# Patient Record
Sex: FEMALE | Race: WHITE | Employment: PART TIME | URBAN - METROPOLITAN AREA
[De-identification: names, ages, dates, MRNs, and addresses within clinical notes are randomized per-mention and may not be internally consistent; named-entity substitution may affect disease eponyms.]

---

## 2018-07-23 ENCOUNTER — HOSPITAL ENCOUNTER (OUTPATIENT)
Dept: PSYCHIATRY | Age: 63
Setting detail: THERAPIES SERIES
Discharge: HOME | End: 2018-07-23
Payer: COMMERCIAL

## 2018-07-23 NOTE — BH NOTE
Pt came in for an intake. Pt said she completed an alcohol detoxic program and was here was IOP. Therapist explained that it was a dual diagnoses program.  Pt said she did not have any mental health issues and did not want to do the intake. Therapist gave Pt a list of substance abuse programs in [facility].

## 2018-12-30 ENCOUNTER — TELEPHONE (OUTPATIENT)
Dept: OTHER | Age: 63
End: 2018-12-30

## 2020-02-27 ENCOUNTER — HOSPITAL ENCOUNTER (INPATIENT)
Age: 65
LOS: 2 days | Discharge: HOME | DRG: 897 | End: 2020-03-01
Attending: EMERGENCY MEDICINE | Admitting: INTERNAL MEDICINE
Payer: COMMERCIAL

## 2020-02-27 ENCOUNTER — APPOINTMENT (OUTPATIENT)
Dept: CT IMAGING | Age: 65
DRG: 897 | End: 2020-02-27
Payer: COMMERCIAL

## 2020-02-27 LAB
A/G RATIO: 0.7 (ref 1.1–2.2)
ALBUMIN SERPL-MCNC: 3.4 G/DL (ref 3.4–5)
ALP BLD-CCNC: 191 U/L (ref 40–129)
ALT SERPL-CCNC: 48 U/L (ref 10–40)
ANION GAP SERPL CALCULATED.3IONS-SCNC: 21 MMOL/L (ref 3–16)
AST SERPL-CCNC: 132 U/L (ref 15–37)
BASOPHILS ABSOLUTE: 0.1 K/UL (ref 0–0.2)
BASOPHILS RELATIVE PERCENT: 1 %
BILIRUB SERPL-MCNC: 1.6 MG/DL (ref 0–1)
BUN BLDV-MCNC: 4 MG/DL (ref 7–20)
CALCIUM SERPL-MCNC: 8.9 MG/DL (ref 8.3–10.6)
CHLORIDE BLD-SCNC: 92 MMOL/L (ref 99–110)
CO2: 23 MMOL/L (ref 21–32)
CREAT SERPL-MCNC: 0.6 MG/DL (ref 0.6–1.2)
EOSINOPHILS ABSOLUTE: 0 K/UL (ref 0–0.6)
EOSINOPHILS RELATIVE PERCENT: 0.2 %
GFR AFRICAN AMERICAN: >60
GFR NON-AFRICAN AMERICAN: >60
GLOBULIN: 4.8 G/DL
GLUCOSE BLD-MCNC: 160 MG/DL (ref 70–99)
HCT VFR BLD CALC: 41 % (ref 36–48)
HEMOGLOBIN: 14 G/DL (ref 12–16)
LIPASE: 36 U/L (ref 13–60)
LYMPHOCYTES ABSOLUTE: 0.7 K/UL (ref 1–5.1)
LYMPHOCYTES RELATIVE PERCENT: 8.5 %
MAGNESIUM: 1.7 MG/DL (ref 1.8–2.4)
MCH RBC QN AUTO: 32.7 PG (ref 26–34)
MCHC RBC AUTO-ENTMCNC: 34.2 G/DL (ref 31–36)
MCV RBC AUTO: 95.8 FL (ref 80–100)
MONOCYTES ABSOLUTE: 0.6 K/UL (ref 0–1.3)
MONOCYTES RELATIVE PERCENT: 8 %
NEUTROPHILS ABSOLUTE: 6.4 K/UL (ref 1.7–7.7)
NEUTROPHILS RELATIVE PERCENT: 82.3 %
PDW BLD-RTO: 16.5 % (ref 12.4–15.4)
PLATELET # BLD: 189 K/UL (ref 135–450)
PMV BLD AUTO: 9.1 FL (ref 5–10.5)
POTASSIUM REFLEX MAGNESIUM: 3.3 MMOL/L (ref 3.5–5.1)
RBC # BLD: 4.28 M/UL (ref 4–5.2)
SODIUM BLD-SCNC: 136 MMOL/L (ref 136–145)
TOTAL PROTEIN: 8.2 G/DL (ref 6.4–8.2)
WBC # BLD: 7.8 K/UL (ref 4–11)

## 2020-02-27 PROCEDURE — 83690 ASSAY OF LIPASE: CPT

## 2020-02-27 PROCEDURE — 96375 TX/PRO/DX INJ NEW DRUG ADDON: CPT

## 2020-02-27 PROCEDURE — 85025 COMPLETE CBC W/AUTO DIFF WBC: CPT

## 2020-02-27 PROCEDURE — 93005 ELECTROCARDIOGRAM TRACING: CPT | Performed by: NURSE PRACTITIONER

## 2020-02-27 PROCEDURE — 83735 ASSAY OF MAGNESIUM: CPT

## 2020-02-27 PROCEDURE — 70450 CT HEAD/BRAIN W/O DYE: CPT

## 2020-02-27 PROCEDURE — 6360000002 HC RX W HCPCS: Performed by: NURSE PRACTITIONER

## 2020-02-27 PROCEDURE — 99291 CRITICAL CARE FIRST HOUR: CPT

## 2020-02-27 PROCEDURE — 80053 COMPREHEN METABOLIC PANEL: CPT

## 2020-02-27 PROCEDURE — G0480 DRUG TEST DEF 1-7 CLASSES: HCPCS

## 2020-02-27 PROCEDURE — 2580000003 HC RX 258: Performed by: NURSE PRACTITIONER

## 2020-02-27 RX ORDER — SODIUM CHLORIDE 0.9 % (FLUSH) 0.9 %
10 SYRINGE (ML) INJECTION EVERY 12 HOURS SCHEDULED
Status: DISCONTINUED | OUTPATIENT
Start: 2020-02-27 | End: 2020-03-01 | Stop reason: HOSPADM

## 2020-02-27 RX ORDER — LORAZEPAM 2 MG/ML
4 INJECTION INTRAMUSCULAR
Status: DISCONTINUED | OUTPATIENT
Start: 2020-02-27 | End: 2020-03-01 | Stop reason: HOSPADM

## 2020-02-27 RX ORDER — SODIUM CHLORIDE 0.9 % (FLUSH) 0.9 %
10 SYRINGE (ML) INJECTION PRN
Status: DISCONTINUED | OUTPATIENT
Start: 2020-02-27 | End: 2020-03-01 | Stop reason: HOSPADM

## 2020-02-27 RX ORDER — LORAZEPAM 2 MG/ML
1 INJECTION INTRAMUSCULAR
Status: DISCONTINUED | OUTPATIENT
Start: 2020-02-27 | End: 2020-03-01 | Stop reason: HOSPADM

## 2020-02-27 RX ORDER — LORAZEPAM 1 MG/1
4 TABLET ORAL
Status: DISCONTINUED | OUTPATIENT
Start: 2020-02-27 | End: 2020-03-01 | Stop reason: HOSPADM

## 2020-02-27 RX ORDER — LORAZEPAM 2 MG/ML
2 INJECTION INTRAMUSCULAR
Status: DISCONTINUED | OUTPATIENT
Start: 2020-02-27 | End: 2020-03-01 | Stop reason: HOSPADM

## 2020-02-27 RX ORDER — LORAZEPAM 2 MG/ML
3 INJECTION INTRAMUSCULAR
Status: DISCONTINUED | OUTPATIENT
Start: 2020-02-27 | End: 2020-03-01 | Stop reason: HOSPADM

## 2020-02-27 RX ORDER — LORAZEPAM 1 MG/1
2 TABLET ORAL
Status: DISCONTINUED | OUTPATIENT
Start: 2020-02-27 | End: 2020-03-01 | Stop reason: HOSPADM

## 2020-02-27 RX ORDER — ONDANSETRON 2 MG/ML
4 INJECTION INTRAMUSCULAR; INTRAVENOUS ONCE
Status: COMPLETED | OUTPATIENT
Start: 2020-02-27 | End: 2020-02-27

## 2020-02-27 RX ORDER — POTASSIUM CHLORIDE 750 MG/1
10 TABLET, EXTENDED RELEASE ORAL ONCE
Status: COMPLETED | OUTPATIENT
Start: 2020-02-27 | End: 2020-02-28

## 2020-02-27 RX ORDER — 0.9 % SODIUM CHLORIDE 0.9 %
1000 INTRAVENOUS SOLUTION INTRAVENOUS ONCE
Status: COMPLETED | OUTPATIENT
Start: 2020-02-27 | End: 2020-02-28

## 2020-02-27 RX ORDER — LORAZEPAM 1 MG/1
1 TABLET ORAL
Status: DISCONTINUED | OUTPATIENT
Start: 2020-02-27 | End: 2020-03-01 | Stop reason: HOSPADM

## 2020-02-27 RX ORDER — MAGNESIUM SULFATE 1 G/100ML
1 INJECTION INTRAVENOUS ONCE
Status: COMPLETED | OUTPATIENT
Start: 2020-02-27 | End: 2020-02-28

## 2020-02-27 RX ORDER — LORAZEPAM 1 MG/1
3 TABLET ORAL
Status: DISCONTINUED | OUTPATIENT
Start: 2020-02-27 | End: 2020-03-01 | Stop reason: HOSPADM

## 2020-02-27 RX ADMIN — LORAZEPAM 1 MG: 2 INJECTION, SOLUTION INTRAMUSCULAR; INTRAVENOUS at 23:43

## 2020-02-27 RX ADMIN — SODIUM CHLORIDE 1000 ML: 9 INJECTION, SOLUTION INTRAVENOUS at 22:47

## 2020-02-27 RX ADMIN — ONDANSETRON 4 MG: 2 INJECTION INTRAMUSCULAR; INTRAVENOUS at 22:45

## 2020-02-27 NOTE — LETTER
[id number] [address] [address]  Phone: [phone]             March 1, 2020    Patient: Emma Sutherland   YOB: 1955   Date of Visit: 2/27/2020       To Whom It May Concern:    Emma Sutherland was seen and treated in our facility  beginning 2/27/2020 until 3/1/2020. She may return to work on 3/3/2020.       Sincerely,       Anne Marie Pinzon RN         Signature:__________________________________

## 2020-02-28 PROBLEM — F10.931 ALCOHOL WITHDRAWAL DELIRIUM (HCC): Status: ACTIVE | Noted: 2020-02-28

## 2020-02-28 LAB
ALBUMIN SERPL-MCNC: 2.8 G/DL (ref 3.4–5)
ALP BLD-CCNC: 150 U/L (ref 40–129)
ALT SERPL-CCNC: 36 U/L (ref 10–40)
AMPHETAMINE SCREEN, URINE: NORMAL
ANION GAP SERPL CALCULATED.3IONS-SCNC: 13 MMOL/L (ref 3–16)
AST SERPL-CCNC: 102 U/L (ref 15–37)
BARBITURATE SCREEN URINE: NORMAL
BASOPHILS ABSOLUTE: 0 K/UL (ref 0–0.2)
BASOPHILS RELATIVE PERCENT: 0.6 %
BENZODIAZEPINE SCREEN, URINE: NORMAL
BILIRUB SERPL-MCNC: 1.3 MG/DL (ref 0–1)
BILIRUBIN DIRECT: 0.6 MG/DL (ref 0–0.3)
BILIRUBIN, INDIRECT: 0.7 MG/DL (ref 0–1)
BUN BLDV-MCNC: 4 MG/DL (ref 7–20)
CALCIUM SERPL-MCNC: 7.9 MG/DL (ref 8.3–10.6)
CANNABINOID SCREEN URINE: NORMAL
CHLORIDE BLD-SCNC: 97 MMOL/L (ref 99–110)
CO2: 27 MMOL/L (ref 21–32)
COCAINE METABOLITE SCREEN URINE: NORMAL
CREAT SERPL-MCNC: <0.5 MG/DL (ref 0.6–1.2)
EKG ATRIAL RATE: 110 BPM
EKG DIAGNOSIS: NORMAL
EKG P AXIS: 49 DEGREES
EKG P-R INTERVAL: 148 MS
EKG Q-T INTERVAL: 382 MS
EKG QRS DURATION: 86 MS
EKG QTC CALCULATION (BAZETT): 516 MS
EKG R AXIS: 12 DEGREES
EKG T AXIS: 3 DEGREES
EKG VENTRICULAR RATE: 110 BPM
EOSINOPHILS ABSOLUTE: 0 K/UL (ref 0–0.6)
EOSINOPHILS RELATIVE PERCENT: 0.3 %
ETHANOL: NORMAL MG/DL (ref 0–0.08)
GFR AFRICAN AMERICAN: >60
GFR NON-AFRICAN AMERICAN: >60
GLUCOSE BLD-MCNC: 113 MG/DL (ref 70–99)
HCT VFR BLD CALC: 35.7 % (ref 36–48)
HEMOGLOBIN: 12.2 G/DL (ref 12–16)
LYMPHOCYTES ABSOLUTE: 1 K/UL (ref 1–5.1)
LYMPHOCYTES RELATIVE PERCENT: 12.7 %
Lab: NORMAL
MAGNESIUM: 1.9 MG/DL (ref 1.8–2.4)
MCH RBC QN AUTO: 32.9 PG (ref 26–34)
MCHC RBC AUTO-ENTMCNC: 34.2 G/DL (ref 31–36)
MCV RBC AUTO: 96.4 FL (ref 80–100)
METHADONE SCREEN, URINE: NORMAL
MONOCYTES ABSOLUTE: 1 K/UL (ref 0–1.3)
MONOCYTES RELATIVE PERCENT: 12.1 %
NEUTROPHILS ABSOLUTE: 5.9 K/UL (ref 1.7–7.7)
NEUTROPHILS RELATIVE PERCENT: 74.3 %
OPIATE SCREEN URINE: NORMAL
OXYCODONE URINE: NORMAL
PDW BLD-RTO: 16.2 % (ref 12.4–15.4)
PH UA: 5
PHENCYCLIDINE SCREEN URINE: NORMAL
PLATELET # BLD: 142 K/UL (ref 135–450)
PMV BLD AUTO: 9.1 FL (ref 5–10.5)
POTASSIUM REFLEX MAGNESIUM: 3.1 MMOL/L (ref 3.5–5.1)
PROPOXYPHENE SCREEN: NORMAL
RBC # BLD: 3.7 M/UL (ref 4–5.2)
SODIUM BLD-SCNC: 137 MMOL/L (ref 136–145)
TOTAL PROTEIN: 6.6 G/DL (ref 6.4–8.2)
WBC # BLD: 8 K/UL (ref 4–11)

## 2020-02-28 PROCEDURE — 87186 SC STD MICRODIL/AGAR DIL: CPT

## 2020-02-28 PROCEDURE — 2580000003 HC RX 258: Performed by: NURSE PRACTITIONER

## 2020-02-28 PROCEDURE — 80048 BASIC METABOLIC PNL TOTAL CA: CPT

## 2020-02-28 PROCEDURE — 80307 DRUG TEST PRSMV CHEM ANLYZR: CPT

## 2020-02-28 PROCEDURE — 87077 CULTURE AEROBIC IDENTIFY: CPT

## 2020-02-28 PROCEDURE — 87086 URINE CULTURE/COLONY COUNT: CPT

## 2020-02-28 PROCEDURE — 2580000003 HC RX 258: Performed by: EMERGENCY MEDICINE

## 2020-02-28 PROCEDURE — 6370000000 HC RX 637 (ALT 250 FOR IP): Performed by: INTERNAL MEDICINE

## 2020-02-28 PROCEDURE — 96365 THER/PROPH/DIAG IV INF INIT: CPT

## 2020-02-28 PROCEDURE — 6360000002 HC RX W HCPCS: Performed by: EMERGENCY MEDICINE

## 2020-02-28 PROCEDURE — 6360000002 HC RX W HCPCS: Performed by: NURSE PRACTITIONER

## 2020-02-28 PROCEDURE — 99223 1ST HOSP IP/OBS HIGH 75: CPT | Performed by: PSYCHIATRY & NEUROLOGY

## 2020-02-28 PROCEDURE — 6370000000 HC RX 637 (ALT 250 FOR IP): Performed by: NURSE PRACTITIONER

## 2020-02-28 PROCEDURE — 80076 HEPATIC FUNCTION PANEL: CPT

## 2020-02-28 PROCEDURE — 2500000003 HC RX 250 WO HCPCS: Performed by: EMERGENCY MEDICINE

## 2020-02-28 PROCEDURE — 85025 COMPLETE CBC W/AUTO DIFF WBC: CPT

## 2020-02-28 PROCEDURE — 2060000000 HC ICU INTERMEDIATE R&B

## 2020-02-28 PROCEDURE — 83735 ASSAY OF MAGNESIUM: CPT

## 2020-02-28 PROCEDURE — 36415 COLL VENOUS BLD VENIPUNCTURE: CPT

## 2020-02-28 RX ORDER — CHLORDIAZEPOXIDE HYDROCHLORIDE 25 MG/1
50 CAPSULE, GELATIN COATED ORAL 3 TIMES DAILY
Status: DISCONTINUED | OUTPATIENT
Start: 2020-02-28 | End: 2020-02-29

## 2020-02-28 RX ORDER — SODIUM CHLORIDE 0.9 % (FLUSH) 0.9 %
10 SYRINGE (ML) INJECTION PRN
Status: DISCONTINUED | OUTPATIENT
Start: 2020-02-28 | End: 2020-03-01 | Stop reason: HOSPADM

## 2020-02-28 RX ORDER — PROMETHAZINE HYDROCHLORIDE 25 MG/1
12.5 TABLET ORAL EVERY 6 HOURS PRN
Status: DISCONTINUED | OUTPATIENT
Start: 2020-02-28 | End: 2020-03-01 | Stop reason: HOSPADM

## 2020-02-28 RX ORDER — POTASSIUM CHLORIDE 20 MEQ/1
40 TABLET, EXTENDED RELEASE ORAL PRN
Status: DISCONTINUED | OUTPATIENT
Start: 2020-02-28 | End: 2020-03-01 | Stop reason: HOSPADM

## 2020-02-28 RX ORDER — POTASSIUM CHLORIDE 7.45 MG/ML
10 INJECTION INTRAVENOUS PRN
Status: DISCONTINUED | OUTPATIENT
Start: 2020-02-28 | End: 2020-03-01 | Stop reason: HOSPADM

## 2020-02-28 RX ORDER — AMLODIPINE BESYLATE 5 MG/1
5 TABLET ORAL DAILY
Status: DISCONTINUED | OUTPATIENT
Start: 2020-02-28 | End: 2020-03-01 | Stop reason: HOSPADM

## 2020-02-28 RX ORDER — SODIUM CHLORIDE 0.9 % (FLUSH) 0.9 %
10 SYRINGE (ML) INJECTION EVERY 12 HOURS SCHEDULED
Status: DISCONTINUED | OUTPATIENT
Start: 2020-02-28 | End: 2020-03-01 | Stop reason: HOSPADM

## 2020-02-28 RX ORDER — ACETAMINOPHEN 650 MG/1
650 SUPPOSITORY RECTAL EVERY 6 HOURS PRN
Status: DISCONTINUED | OUTPATIENT
Start: 2020-02-28 | End: 2020-03-01 | Stop reason: HOSPADM

## 2020-02-28 RX ORDER — ONDANSETRON 2 MG/ML
4 INJECTION INTRAMUSCULAR; INTRAVENOUS EVERY 6 HOURS PRN
Status: DISCONTINUED | OUTPATIENT
Start: 2020-02-28 | End: 2020-03-01 | Stop reason: HOSPADM

## 2020-02-28 RX ORDER — ASPIRIN 81 MG/1
81 TABLET, CHEWABLE ORAL EVERY OTHER DAY
Status: DISCONTINUED | OUTPATIENT
Start: 2020-02-28 | End: 2020-03-01 | Stop reason: HOSPADM

## 2020-02-28 RX ORDER — SODIUM CHLORIDE 9 MG/ML
INJECTION, SOLUTION INTRAVENOUS CONTINUOUS
Status: DISCONTINUED | OUTPATIENT
Start: 2020-02-28 | End: 2020-03-01 | Stop reason: HOSPADM

## 2020-02-28 RX ORDER — POTASSIUM CHLORIDE 20 MEQ/1
40 TABLET, EXTENDED RELEASE ORAL ONCE
Status: DISCONTINUED | OUTPATIENT
Start: 2020-02-28 | End: 2020-03-01 | Stop reason: HOSPADM

## 2020-02-28 RX ORDER — LISINOPRIL 20 MG/1
20 TABLET ORAL DAILY
Status: DISCONTINUED | OUTPATIENT
Start: 2020-02-28 | End: 2020-03-01 | Stop reason: HOSPADM

## 2020-02-28 RX ORDER — ATORVASTATIN CALCIUM 10 MG/1
10 TABLET, FILM COATED ORAL DAILY
Status: DISCONTINUED | OUTPATIENT
Start: 2020-02-28 | End: 2020-02-28

## 2020-02-28 RX ORDER — ACETAMINOPHEN 325 MG/1
650 TABLET ORAL EVERY 6 HOURS PRN
Status: DISCONTINUED | OUTPATIENT
Start: 2020-02-28 | End: 2020-03-01 | Stop reason: HOSPADM

## 2020-02-28 RX ADMIN — LISINOPRIL 20 MG: 20 TABLET ORAL at 09:14

## 2020-02-28 RX ADMIN — ENOXAPARIN SODIUM 40 MG: 40 INJECTION SUBCUTANEOUS at 09:14

## 2020-02-28 RX ADMIN — MAGNESIUM SULFATE HEPTAHYDRATE 1 G: 1 INJECTION, SOLUTION INTRAVENOUS at 00:28

## 2020-02-28 RX ADMIN — SERTRALINE HYDROCHLORIDE 100 MG: 50 TABLET ORAL at 09:14

## 2020-02-28 RX ADMIN — LORAZEPAM 2 MG: 2 INJECTION, SOLUTION INTRAMUSCULAR; INTRAVENOUS at 09:17

## 2020-02-28 RX ADMIN — ATORVASTATIN CALCIUM 10 MG: 10 TABLET, FILM COATED ORAL at 09:14

## 2020-02-28 RX ADMIN — SODIUM CHLORIDE: 9 INJECTION, SOLUTION INTRAVENOUS at 16:08

## 2020-02-28 RX ADMIN — POTASSIUM CHLORIDE 40 MEQ: 20 TABLET, EXTENDED RELEASE ORAL at 20:49

## 2020-02-28 RX ADMIN — Medication 10 ML: at 09:15

## 2020-02-28 RX ADMIN — SODIUM CHLORIDE: 9 INJECTION, SOLUTION INTRAVENOUS at 05:20

## 2020-02-28 RX ADMIN — POTASSIUM CHLORIDE 10 MEQ: 10 TABLET, EXTENDED RELEASE ORAL at 00:25

## 2020-02-28 RX ADMIN — FOLIC ACID: 5 INJECTION, SOLUTION INTRAMUSCULAR; INTRAVENOUS; SUBCUTANEOUS at 05:20

## 2020-02-28 RX ADMIN — Medication 10 ML: at 09:14

## 2020-02-28 RX ADMIN — CHLORDIAZEPOXIDE HYDROCHLORIDE 50 MG: 25 CAPSULE ORAL at 20:49

## 2020-02-28 RX ADMIN — CHLORDIAZEPOXIDE HYDROCHLORIDE 50 MG: 25 CAPSULE ORAL at 09:14

## 2020-02-28 RX ADMIN — AMLODIPINE BESYLATE 5 MG: 5 TABLET ORAL at 09:14

## 2020-02-28 RX ADMIN — PHENYTOIN SODIUM 1155 MG: 50 INJECTION INTRAMUSCULAR; INTRAVENOUS at 04:26

## 2020-02-28 RX ADMIN — ASPIRIN 81 MG 81 MG: 81 TABLET ORAL at 09:14

## 2020-02-28 RX ADMIN — SODIUM CHLORIDE: 9 INJECTION, SOLUTION INTRAVENOUS at 23:49

## 2020-02-28 ASSESSMENT — PAIN SCALES - GENERAL: PAINLEVEL_OUTOF10: 0

## 2020-02-28 NOTE — PROGRESS NOTES
Notified Dr. Cher Helm answering service, Dani Moritz, @ [phone] 2/28/20 re: neurology consult. -[phone] [facility]

## 2020-02-28 NOTE — CONSULTS
In patient Neurology consult        [facility] Neurology      Arian Rizvi MD      Amelia Reasons  1955    Date of Service: 2/28/2020    Referring Physician: Parviz Marino MD    Most of the history was obtained from detailed chart reviewing and discussion with the patient. The patient is currently confused and unable to provide me with accurate history. Reason for the consult and CC: Acute delirium and possible new onset seizure. HPI:   The patient is a 62y.o.  years old female with history of hypertension, hyperlipidemia, depression and alcohol abuse who was admitted to the hospital yesterday with possible new onset witnessed seizure. According to report, symptoms started last night at work. She has not observed a generalized motor seizure for at least few minutes. Degree was severe. No triggers. Other associated symptoms included postictal state and confusion. No other living aggravating factors. She is alcoholic according to report and she has history of alcohol withdrawal seizure in the past.  According to report, her last drink was 2 days ago. The patient is currently lethargic but waxing and waning and was able to answer simple questions. She denies any headache, chest pain, dysphagia or dysarthria. Initial work-up in the ED revealed unremarkable CBC and CMP except for potassium of 3.1 and calcium 7.9. UDS was negative. BAL is negative. Initial CT of the head showed no acute findings. Other review of system was unremarkable. Family history: Noncontributory. No family history of epilepsy. Past Medical History:   Diagnosis Date    Anxiety     Hyperlipidemia     Hypertension      No past surgical history on file.   Social History     Tobacco Use    Smoking status: Former Smoker     Packs/day: 1.50     Years: 20.00     Pack years: 30.00     Types: Cigarettes    Smokeless tobacco: Never Used   Substance Use Topics    Alcohol use: Yes     Comment: 1 bottle of wine daily  Drug use: No     Allergies   Allergen Reactions    Codeine Nausea Only    Pcn [Penicillins]      Current Facility-Administered Medications   Medication Dose Route Frequency Provider Last Rate Last Dose    amLODIPine (NORVASC) tablet 5 mg  5 mg Oral Daily Shonda King, APRN - NP   5 mg at 02/28/20 9716    aspirin chewable tablet 81 mg  81 mg Oral Every Other Day Shonda Hendrixs, APRN - NP   81 mg at 02/28/20 0914    lisinopril (PRINIVIL;ZESTRIL) tablet 20 mg  20 mg Oral Daily Shonda Hendrixs, APRN - NP   20 mg at 02/28/20 0914    sertraline (ZOLOFT) tablet 100 mg  100 mg Oral Daily Shonda Hendrixs, APRN - NP   100 mg at 02/28/20 0914    0.9 % sodium chloride infusion   Intravenous Continuous Shonda Fernando, APRN -  mL/hr at 02/28/20 0520      sodium chloride flush 0.9 % injection 10 mL  10 mL Intravenous 2 times per day Shonda Fernando, APRN - NP   10 mL at 02/28/20 0914    sodium chloride flush 0.9 % injection 10 mL  10 mL Intravenous PRN Shonda King, APRN - NP        sodium chloride 0.9 % 6,714 mL with folic acid 1 mg, adult multi-vitamin with vitamin k 10 mL, thiamine 100 mg   Intravenous Daily Shonda Fernando, APRN - NP   Stopped at 02/28/20 0915    acetaminophen (TYLENOL) tablet 650 mg  650 mg Oral Q6H PRN Shonda Hendrixs, APRN - NP        acetaminophen (TYLENOL) suppository 650 mg  650 mg Rectal Q6H PRN [place] Fernando, APRN - NP        promethazine (PHENERGAN) tablet 12.5 mg  12.5 mg Oral Q6H PRN [place] Hendrixs, APRN - NP        ondansetron (ZOFRAN) injection 4 mg  4 mg Intravenous Q6H PRN Shonda Fernando, APRN - NP        enoxaparin (LOVENOX) injection 40 mg  40 mg Subcutaneous Daily Shonda Hendrixs, APRN - NP   40 mg at 02/28/20 0914    chlordiazePOXIDE (LIBRIUM) capsule 50 mg  50 mg Oral TID Shonda Fernando, APRN - NP   50 mg at 02/28/20 0914    potassium chloride (KLOR-CON M) extended release tablet 40 mEq  40 mEq Oral PRN Clinton Mccauley MD        Or    potassium bicarb-citric acid (EFFER-K) effervescent tablet 40 mEq  40 mEq Oral PRN Christina Medina MD        Or    potassium chloride 10 mEq/100 mL IVPB (Peripheral Line)  10 mEq Intravenous PRN Darleen Ferreira MD        potassium chloride (KLOR-CON M) extended release tablet 40 mEq  40 mEq Oral Once Darleen Ferreira MD        sodium chloride flush 0.9 % injection 10 mL  10 mL Intravenous 2 times per day FRANCIS Blevins - CNP   10 mL at 02/28/20 0915    sodium chloride flush 0.9 % injection 10 mL  10 mL Intravenous PRN Sara Mcbride APRN - CNP        LORazepam (ATIVAN) tablet 1 mg  1 mg Oral Q1H PRN Olita Ovens, APRN - NP        Or    LORazepam (ATIVAN) injection 1 mg  1 mg Intravenous Q1H PRN Olita Ovens, APRN - NP   1 mg at 02/27/20 2343    Or    LORazepam (ATIVAN) tablet 2 mg  2 mg Oral Q1H PRN Olita Ovens, APRN - NP        Or    LORazepam (ATIVAN) injection 2 mg  2 mg Intravenous Q1H PRN Olita Ovens, APRN - NP   2 mg at 02/28/20 1329    Or    LORazepam (ATIVAN) tablet 3 mg  3 mg Oral Q1H PRN Olita Ovens, APRN - NP        Or    LORazepam (ATIVAN) injection 3 mg  3 mg Intravenous Q1H PRN Olita Ovens, APRN - NP        Or    LORazepam (ATIVAN) tablet 4 mg  4 mg Oral Q1H PRN Olita Ovens, APRN - NP        Or    LORazepam (ATIVAN) injection 4 mg  4 mg Intravenous Q1H PRN Geraldine Knott, APRN - NP           ROS: 10-14 system review was limited due to MS changes or as per HPI. Constitutional:   Vitals:    02/28/20 0522 02/28/20 0912 02/28/20 0914 02/28/20 1207   BP: (!) 144/84 137/79  122/80   Pulse: 105 105 105 99   Resp: 18 18  18   Temp: 98.7 °F (37.1 °C) 99.2 °F (37.3 °C)  99.4 °F (37.4 °C)   TempSrc: Oral Oral  Oral   SpO2: 91% 91%  92%   Weight: 173 lb 4.5 oz (78.6 kg)          General appearance: Confused   Eye: PRRR  Fundus: Funduscopic examination could not be performed due to patient's poor cooperation. Neck: supple  Cardiovascular: No lower leg edema with good pulsation.    Mental Status:   AAO times one  Poor attention and concentration. Waxing and waning. Unable to assess recent or remote memory due to confusion  Language: Fluent but with poor comprehension and not following directions  Unable to assess fund of knowledge due to confusion. Cranial Nerves:   II: Visual fields: NT due to confusion. Pupils: equal, round, reactive to light  III,IV,VI: Extra Ocular Movements are intact. No nystagmus  V: Facial sensation : NT due to confusion  VII: Facial strength and movements: intact and symmetric  VIII: Hearing: NT due to confusion  IX: Palate elevation NT due to confusion  XI: Shoulder shrug: NT due to confusion  XII: Tongue movements: NT due to confusion  Musculoskeletal:  The patient can move all 4 extremities. No apparent focal weakness. Tone: Normal tone. No rigidity. Reflexes: Bilateral biceps 2/4, triceps 2/4, brachial radialis 2/4, knee 2/4 and ankle 2/4. Planters: flexor bilaterally. Coordination: NT due to confusion  Sensation: NT due to confusion  Gait/Posture: NT due to confusion    Data:  LABS:   Lab Results   Component Value Date     02/28/2020    K 3.1 02/28/2020    CL 97 02/28/2020    CO2 27 02/28/2020    BUN 4 02/28/2020    CREATININE <0.5 02/28/2020    GFRAA >60 02/28/2020    LABGLOM >60 02/28/2020    GLUCOSE 113 02/28/2020    PHOS 2.7 06/13/2018    MG 1.90 02/28/2020    CALCIUM 7.9 02/28/2020     Lab Results   Component Value Date    WBC 8.0 02/28/2020    RBC 3.70 02/28/2020    HGB 12.2 02/28/2020    HCT 35.7 02/28/2020    MCV 96.4 02/28/2020    RDW 16.2 02/28/2020     02/28/2020     Lab Results   Component Value Date    INR 1.09 04/09/2016    PROTIME 12.5 04/09/2016       Neuroimaging were independently reviewed by myself. Reviewed notes from different physicians  Reviewed lab and blood testing    Impression:  Acute encephalopathy and possible new onset seizure. Could be consistent with alcohol withdrawal seizure according to report.   No risk for epilepsy  Alcohol

## 2020-02-28 NOTE — FLOWSHEET NOTE
02/28/20 0522   Assessment   Charting Type Admission   Neurological   Neuro (WDL) X   Level of Consciousness 1   Orientation Level Oriented X4   Cognition Poor attention/concentration;Poor safety awareness;Poor judgement; Follows commands   Language Delayed responses;Clear   NIH/MNIHSS Stroke Scale Assessed No   [place] Coma Scale   Eye Opening 3   Best Verbal Response 5   Best Motor Response 6   Amandeep Coma Scale Score 14   HEENT   HEENT (WDL) X   Right Eye Impaired vision   Left Eye Impaired vision   Teeth Missing teeth   Respiratory   Respiratory (WDL) X   Respiratory Pattern Regular   Respiratory Depth Normal   Respiratory Quality/Effort Unlabored   Chest Assessment Trachea midline; Chest expansion symmetrical   L Breath Sounds Diminished   R Breath Sounds Diminished   Cardiac   Cardiac (WDL) WDL   Cardiac Rhythm ST   Rhythm Interpretation   Pulse 105   Cardiac Monitor   Telemetry Monitor On Yes   Telemetry Audible Yes   Telemetry Alarms Set Yes   Gastrointestinal   Abdominal (WDL) X   Abdomen Inspection Distended; Taut   Tenderness Soft; No guarding;Nontender   RUQ Bowel Sounds Active   LUQ Bowel Sounds Active   RLQ Bowel Sounds Active   LLQ Bowel Sounds Active   Peripheral Vascular   Peripheral Vascular (WDL) WDL   Edema None   Skin Color/Condition   Skin Color/Condition (WDL) WDL   Skin Integrity   Skin Integrity (WDL) X   Skin Integrity Redness  (blanchable)   Location sacrum   Preventative Dressing No   Skin Fold Management No   Multiple Skin Integrity Sites No   Musculoskeletal   Musculoskeletal (WDL) WDL   Genitourinary   Genitourinary (WDL) WDL   Flank Tenderness No   Suprapubic Tenderness No   Dysuria No   Anus/Rectum   Anus/Rectum (WDL) WDL   Psychosocial   Psychosocial (WDL) WDL

## 2020-02-28 NOTE — PROGRESS NOTES
Patient admitted to room 444 from ED. Patient oriented to room, call light, bed rails, phone, lights and bathroom. Patient instructed about the schedule of the day including: vital sign frequency, lab draws, possible tests, frequency of MD and staff rounds, including RN/MD rounding together at bedside, daily weights, and I &O's. Patient instructed about prescribed diet, how to use 8MENU, and television. Bed alarm in place, patient aware of placement and reason. Telemetry box  in place, patient aware of placement and reason. Bed locked, in lowest position, side rails up 2/4, call light within reach. Will continue to monitor.

## 2020-02-28 NOTE — PROGRESS NOTES
4 Eyes Skin Assessment     The patient is being assess for   Shift Handoff    I agree that 2 RN's have performed a thorough Head to Toe Skin Assessment on the patient. ALL assessment sites listed below have been assessed. Areas assessed by both nurses:   [x]   Head, Face, and Ears   [x]   Shoulders, Back, and Chest, Abdomen  [x]   Arms, Elbows, and Hands   [x]   Coccyx, Sacrum, and Ischium  [x]   Legs, Feet, and Heels            **SHARE this note so that the co-signing nurse is able to place an eSignature**    Co-signer eSignature:   Electronically signed by Wen Poe RN on 2/28/2020 at 6:50 AM    Does the Patient have Skin Breakdown?   No          Kiran Prevention initiated:  No   Wound Care Orders initiated:  No      [facility] nurse consulted for Pressure Injury (Stage 3,4, Unstageable, DTI, NWPT, Complex wounds)and New or Established Ostomies:  No      Primary Nurse eSignature: Electronically signed by Irving Hays RN on 2/28/20 at 5:55 AM

## 2020-02-28 NOTE — ED NOTES
Report given Floor RN. Pt transported to the floor via stretcher.      Gamal Zuñiga RN  02/28/20 4702

## 2020-02-28 NOTE — PROGRESS NOTES
Hospitalist Progress Note      PCP: Omari Hackett MD    Date of Admission: 2/27/2020    Chief Complaint: Seizure    Hospital Course: H&P reviewed. Patient with history of alcohol abuse with alcohol withdrawal in past with seizures. Had witnessed seizure yesterday last drink was day prior to presentation. Was admitted for further care    Subjective:     No seizures since admission. Patient denies any complaints today. Medications:  Reviewed    Infusion Medications    sodium chloride 125 mL/hr at 02/28/20 0520     Scheduled Medications    amLODIPine  5 mg Oral Daily    aspirin  81 mg Oral Every Other Day    lisinopril  20 mg Oral Daily    sertraline  100 mg Oral Daily    atorvastatin  10 mg Oral Daily    sodium chloride flush  10 mL Intravenous 2 times per day    folic acid, thiamine, multi-vitamin with vitamin K infusion   Intravenous Daily    enoxaparin  40 mg Subcutaneous Daily    chlordiazePOXIDE  50 mg Oral TID    sodium chloride flush  10 mL Intravenous 2 times per day     PRN Meds: sodium chloride flush, acetaminophen, acetaminophen, promethazine, ondansetron, potassium chloride **OR** potassium alternative oral replacement **OR** potassium chloride, sodium chloride flush, LORazepam **OR** LORazepam **OR** LORazepam **OR** LORazepam **OR** LORazepam **OR** LORazepam **OR** LORazepam **OR** LORazepam    No intake or output data in the 24 hours ending 02/28/20 1244    Physical Exam Performed:    /79   Pulse 105   Temp 99.2 °F (37.3 °C) (Oral)   Resp 18   Wt 173 lb 4.5 oz (78.6 kg)   SpO2 91%   BMI 27.97 kg/m²     General appearance: No apparent distress, appears stated age and cooperative. HEENT: Pupils equal, round, and reactive to light. Conjunctivae/corneas clear. Neck: Supple, with full range of motion. No jugular venous distention. Trachea midline. Respiratory:  Normal respiratory effort.  Clear to auscultation, bilaterally without Rales/Wheezes/Rhonchi. Cardiovascular: Regular rate and rhythm with normal S1/S2 without murmurs, rubs or gallops. Abdomen: Soft, non-tender, non-distended with normal bowel sounds. Musculoskeletal: No clubbing, cyanosis or edema bilaterally. Full range of motion without deformity. Skin: Warm and dry  Neurologic:  Neurovascularly intact without any focal sensory/motor deficits. Cranial nerves: II-XII intact, grossly non-focal.  Mild hand tremors  Psychiatric: Alert, not anxious appearing  Capillary Refill: Brisk,< 3 seconds   Peripheral Pulses: +2 palpable, equal bilaterally       Labs:   Recent Labs     02/27/20 2251 02/28/20  0555   WBC 7.8 8.0   HGB 14.0 12.2   HCT 41.0 35.7*    142     Recent Labs     02/27/20 2251 02/28/20  0555    137   K 3.3* 3.1*   CL 92* 97*   CO2 23 27   BUN 4* 4*   CREATININE 0.6 <0.5*   CALCIUM 8.9 7.9*     Recent Labs     02/27/20 2251 02/28/20  0555   * 102*   ALT 48* 36   BILIDIR  --  0.6*   BILITOT 1.6* 1.3*   ALKPHOS 191* 150*     No results for input(s): INR in the last 72 hours. No results for input(s): Eugune Ran in the last 72 hours. Urinalysis:      Lab Results   Component Value Date    NITRU Negative 06/13/2018    WBCUA 3-5 04/07/2016    BACTERIA 3+ 04/07/2016    RBCUA 3-5 04/07/2016    BLOODU Negative 06/13/2018    SPECGRAV 1.010 06/13/2018    GLUCOSEU Negative 06/13/2018       Radiology:  CT Head WO Contrast   Final Result   No hemorrhage or mass identified      Periventricular and scattered frontal parietal white matter disease, likely   due to small-vessel ischemic change, with more focal remote infarct on the   right.   Ischemic changes have progressed since 2016                 Assessment/Plan:    Active Hospital Problems    Diagnosis    [facility] [F10.239]    HTN (hypertension) [I10]    HLD (hyperlipidemia) [E78.5]    DTs (delirium tremens) ([facility] [F10.231]    Depression [F32.9]    Alcohol abuse [F10.10] Seizure: has known hx of seizures with alcohol withdrawal in the past. Last drink reported on 2/26. CT head non acute. Given a dose of IV dilantin in ER. Continue seizure precautions. Neuro consulted. Alcohol abuse with withdrawal seizure: Supportive care with CIWA  Protocol, scheduled Librium. Transaminitis : likely due to chronic alcohol use. LFT improving on repeat lab. Hold statin      Hyperlipidemia: hold statin due to trasaminitis     HTN : continue home meds. Hypokalemia: replace    Hypomagnesemia: replaced with improvement .  Resolved     Hypocalcemia: mild, Corrected calcium for albumin normal.     DVT Prophylaxis: Lovenox   diet: DIET GENERAL;  Code Status: Full Code    PT/OT Eval Status: Not indicated at this time      Dispo - 1 to 2 days pending clinical course    Racheal Marsh MD

## 2020-02-28 NOTE — H&P
Hospital Medicine History & Physical      PCP: Baltazar Scott MD    Date of Admission: 2/27/2020    Date of Service: Pt seen/examined on 2/28/2020 and Admitted to Inpatient with expected LOS greater than two midnights due to medical therapy. Chief Complaint:    Chief Complaint   Patient presents with    Seizures     at work not feeling well, EMS called and while they were there patient had 20-30 second seizure - no reported hx     History Of Present Illness:      62 y.o. female, with PMH of HTN, HLD, alcohol abuse, and anxiety/depression, who presented to [facility] with seizure. History was obtained from the patient and review of the EMR. The patient states that while at work last night, she was not feeling well when she suddenly was witnessed having a 20-30 second seizure. The patient states that she has had an alcohol withdrawal seizure in the past, these are the only times when she has had a seizure is when she has detoxed from alcohol. She reports to me only drinking 2 glasses of wine on a daily basis. Last known drink was on 02/26/2020 around 2100. Per ED report, the patient stated that she drinks a bottle of wine on a daily basis. Denies any injury, pain, fever/chills, or any other symptoms. The patient denied any other associated symptoms as well as any aggravating and/or alleviating factors. At the time of this assessment, the patient was resting comfortably in bed. She currently denies any chest pain, back pain, abdominal pain, shortness of breath, numbness, tingling, N/V/C/D, fever and/or chills. Past Medical History:          Diagnosis Date    Anxiety     Hyperlipidemia     Hypertension        Past Surgical History:      No past surgical history on file. Medications Prior to Admission:      Prior to Admission medications    Medication Sig Start Date End Date Taking?  Authorizing Provider   Multiple Vitamins-Minerals (THERAPEUTIC MULTIVITAMIN-MINERALS) tablet Take 1 tablet gallops. Abdomen: Soft, non-tender, non-distended with normal bowel sounds. Musculoskeletal:  No clubbing, cyanosis or edema bilaterally. Full range of motion without deformity. Skin: Skin color, texture, turgor normal.  No rashes or lesions. Neurologic:  Neurovascularly intact without any focal sensory/motor deficits. Cranial nerves: II-XII intact, grossly non-focal.  Psychiatric:  Alert and oriented, thought content appropriate, normal insight  Capillary Refill: Brisk,< 3 seconds   Peripheral Pulses: +2 palpable, equal bilaterally       Labs:     Recent Labs     02/27/20  2251   WBC 7.8   HGB 14.0   HCT 41.0        Recent Labs     02/27/20  2251      K 3.3*   CL 92*   CO2 23   BUN 4*   CREATININE 0.6   CALCIUM 8.9     Recent Labs     02/27/20  2251   *   ALT 48*   BILITOT 1.6*   ALKPHOS 191*     No results for input(s): INR in the last 72 hours. No results for input(s): Estela Gong in the last 72 hours. Urinalysis:      Lab Results   Component Value Date    NITRU Negative 06/13/2018    WBCUA 3-5 04/07/2016    BACTERIA 3+ 04/07/2016    RBCUA 3-5 04/07/2016    BLOODU Negative 06/13/2018    SPECGRAV 1.010 06/13/2018    GLUCOSEU Negative 06/13/2018       Radiology:     CXR: I have reviewed the CXR with the following interpretation: n/a  EKG:  I have reviewed the EKG with the following interpretation: ST, rate of 110. CT Head WO Contrast   Final Result   No hemorrhage or mass identified      Periventricular and scattered frontal parietal white matter disease, likely   due to small-vessel ischemic change, with more focal remote infarct on the   right.   Ischemic changes have progressed since 2016             ASSESSMENT:    ROSARIO/Yadi Aldana [address] Problems    Diagnosis Date Noted    Alcohol withdrawal ([facility] [F10.239]     HTN (hypertension) [I10] 04/08/2016    HLD (hyperlipidemia) [E78.5] 04/08/2016    DTs (delirium tremens) ([facility] [F10.231] 04/08/2016    Depression [F32.9] 04/08/2016    Alcohol abuse [F10.10] 04/08/2016     PLAN:    Seizure, unknown etiology, though likely r/t alcohol withdrawal. Hx of alcohol withdrawal seizure and DTs.  - CT head unremarkable  - CIWA + ativan  - Librium TID  - Seizure precautions, fall precautions  - Check UDS  - Banana bag, MIVFs  - Tele monitoring  - Dilantin x1 given in ED    Transaminitis, , ALT 48, alk phos 191.  - Monitor with HFP    Essential HTN, controlled. - Continue home norvasc, lisinopril    Hx of HLD, controlled with statin. - Continue home simvastatin, sub Lipitor  - Follow-up with PCP for med adjustments    Anxiety/depression, mood is stable. - Continue home zoloft      DVT Prophylaxis: Lovenox  Diet: No diet orders on file  Code Status: Prior    PT/OT Eval Status: Not indicated    Dispo - Pending clinical improvement       Geraldine Denton - NP    Thank you Brittny Salas MD for the opportunity to be involved in this patient's care.  If you have any questions or concerns please feel free to contact me at [phone].  -------------------------Anticipated Dr. Brigette britt-------------------

## 2020-02-28 NOTE — ED NOTES
Patient admits to drinking wine daily, not able to state last drink. Reports only drinking \"a couple\" glasses per day. Does not appear to be interested in her care, not wanting to respond to questions. Not following commands.      Luis Witt RN  02/27/20 0314

## 2020-02-29 LAB
A/G RATIO: 0.8 (ref 1.1–2.2)
ALBUMIN SERPL-MCNC: 2.6 G/DL (ref 3.4–5)
ALP BLD-CCNC: 136 U/L (ref 40–129)
ALT SERPL-CCNC: 26 U/L (ref 10–40)
ANION GAP SERPL CALCULATED.3IONS-SCNC: 12 MMOL/L (ref 3–16)
AST SERPL-CCNC: 82 U/L (ref 15–37)
BASOPHILS ABSOLUTE: 0 K/UL (ref 0–0.2)
BASOPHILS RELATIVE PERCENT: 1 %
BILIRUB SERPL-MCNC: 1.3 MG/DL (ref 0–1)
BUN BLDV-MCNC: 3 MG/DL (ref 7–20)
CALCIUM SERPL-MCNC: 7.6 MG/DL (ref 8.3–10.6)
CHLORIDE BLD-SCNC: 103 MMOL/L (ref 99–110)
CO2: 23 MMOL/L (ref 21–32)
CREAT SERPL-MCNC: <0.5 MG/DL (ref 0.6–1.2)
EOSINOPHILS ABSOLUTE: 0.1 K/UL (ref 0–0.6)
EOSINOPHILS RELATIVE PERCENT: 2.1 %
GFR AFRICAN AMERICAN: >60
GFR NON-AFRICAN AMERICAN: >60
GLOBULIN: 3.3 G/DL
GLUCOSE BLD-MCNC: 84 MG/DL (ref 70–99)
HCT VFR BLD CALC: 33.9 % (ref 36–48)
HEMOGLOBIN: 11.4 G/DL (ref 12–16)
LYMPHOCYTES ABSOLUTE: 0.5 K/UL (ref 1–5.1)
LYMPHOCYTES RELATIVE PERCENT: 11.6 %
MCH RBC QN AUTO: 32.9 PG (ref 26–34)
MCHC RBC AUTO-ENTMCNC: 33.6 G/DL (ref 31–36)
MCV RBC AUTO: 98 FL (ref 80–100)
MONOCYTES ABSOLUTE: 0.4 K/UL (ref 0–1.3)
MONOCYTES RELATIVE PERCENT: 9.2 %
NEUTROPHILS ABSOLUTE: 3.3 K/UL (ref 1.7–7.7)
NEUTROPHILS RELATIVE PERCENT: 76.1 %
PDW BLD-RTO: 16.3 % (ref 12.4–15.4)
PLATELET # BLD: 98 K/UL (ref 135–450)
PLATELET SLIDE REVIEW: ABNORMAL
PMV BLD AUTO: 9.5 FL (ref 5–10.5)
POTASSIUM SERPL-SCNC: 3.3 MMOL/L (ref 3.5–5.1)
RBC # BLD: 3.46 M/UL (ref 4–5.2)
SLIDE REVIEW: ABNORMAL
SODIUM BLD-SCNC: 138 MMOL/L (ref 136–145)
TOTAL PROTEIN: 5.9 G/DL (ref 6.4–8.2)
WBC # BLD: 4.4 K/UL (ref 4–11)

## 2020-02-29 PROCEDURE — 6370000000 HC RX 637 (ALT 250 FOR IP): Performed by: INTERNAL MEDICINE

## 2020-02-29 PROCEDURE — 36415 COLL VENOUS BLD VENIPUNCTURE: CPT

## 2020-02-29 PROCEDURE — 2060000000 HC ICU INTERMEDIATE R&B

## 2020-02-29 PROCEDURE — 2500000003 HC RX 250 WO HCPCS: Performed by: NURSE PRACTITIONER

## 2020-02-29 PROCEDURE — 99233 SBSQ HOSP IP/OBS HIGH 50: CPT | Performed by: PSYCHIATRY & NEUROLOGY

## 2020-02-29 PROCEDURE — 85025 COMPLETE CBC W/AUTO DIFF WBC: CPT

## 2020-02-29 PROCEDURE — 80053 COMPREHEN METABOLIC PANEL: CPT

## 2020-02-29 PROCEDURE — 6370000000 HC RX 637 (ALT 250 FOR IP): Performed by: NURSE PRACTITIONER

## 2020-02-29 PROCEDURE — 6360000002 HC RX W HCPCS: Performed by: NURSE PRACTITIONER

## 2020-02-29 PROCEDURE — 2580000003 HC RX 258: Performed by: NURSE PRACTITIONER

## 2020-02-29 RX ORDER — CIPROFLOXACIN 500 MG/1
500 TABLET, FILM COATED ORAL EVERY 12 HOURS SCHEDULED
Status: DISCONTINUED | OUTPATIENT
Start: 2020-02-29 | End: 2020-03-01 | Stop reason: HOSPADM

## 2020-02-29 RX ORDER — LOPERAMIDE HYDROCHLORIDE 2 MG/1
2 CAPSULE ORAL 4 TIMES DAILY PRN
Status: DISCONTINUED | OUTPATIENT
Start: 2020-02-29 | End: 2020-03-01 | Stop reason: HOSPADM

## 2020-02-29 RX ADMIN — SERTRALINE HYDROCHLORIDE 100 MG: 50 TABLET ORAL at 09:44

## 2020-02-29 RX ADMIN — SODIUM CHLORIDE: 9 INJECTION, SOLUTION INTRAVENOUS at 09:53

## 2020-02-29 RX ADMIN — CIPROFLOXACIN HYDROCHLORIDE 500 MG: 500 TABLET, FILM COATED ORAL at 20:36

## 2020-02-29 RX ADMIN — CHLORDIAZEPOXIDE HYDROCHLORIDE 50 MG: 25 CAPSULE ORAL at 09:53

## 2020-02-29 RX ADMIN — FOLIC ACID: 5 INJECTION, SOLUTION INTRAMUSCULAR; INTRAVENOUS; SUBCUTANEOUS at 09:57

## 2020-02-29 RX ADMIN — CIPROFLOXACIN HYDROCHLORIDE 500 MG: 500 TABLET, FILM COATED ORAL at 09:44

## 2020-02-29 RX ADMIN — ENOXAPARIN SODIUM 40 MG: 40 INJECTION SUBCUTANEOUS at 09:45

## 2020-02-29 RX ADMIN — AMLODIPINE BESYLATE 5 MG: 5 TABLET ORAL at 09:44

## 2020-02-29 RX ADMIN — POTASSIUM BICARBONATE 40 MEQ: 782 TABLET, EFFERVESCENT ORAL at 18:56

## 2020-02-29 RX ADMIN — Medication 10 ML: at 09:45

## 2020-02-29 RX ADMIN — SODIUM CHLORIDE: 9 INJECTION, SOLUTION INTRAVENOUS at 20:36

## 2020-02-29 RX ADMIN — LISINOPRIL 20 MG: 20 TABLET ORAL at 09:44

## 2020-02-29 RX ADMIN — LOPERAMIDE HYDROCHLORIDE 2 MG: 2 CAPSULE ORAL at 18:56

## 2020-02-29 ASSESSMENT — PAIN DESCRIPTION - LOCATION: LOCATION: GENERALIZED

## 2020-02-29 ASSESSMENT — PAIN SCALES - GENERAL: PAINLEVEL_OUTOF10: 2

## 2020-02-29 NOTE — PLAN OF CARE
Problem: Confusion - Acute:  Goal: Absence of continued neurological deterioration signs and symptoms  Description  Absence of continued neurological deterioration signs and symptoms  Outcome: Ongoing     Problem: Injury - Risk of, Physical Injury:  Goal: Absence of physical injury  Description  Absence of physical injury  Outcome: Ongoing     Problem: Falls - Risk of:  Goal: Absence of physical injury  Description  Absence of physical injury  Outcome: Ongoing

## 2020-02-29 NOTE — PROGRESS NOTES
Hospitalist Progress Note      PCP: Escobar Brewer MD    Date of Admission: 2/27/2020    Chief Complaint: Seizure    Hospital Course: H&P reviewed. Patient with history of alcohol abuse with alcohol withdrawal in past with seizures. Had witnessed seizure yesterday last drink was day prior to presentation. Was admitted for further care    Subjective:     No seizures since admission. Reports urinary frequency without dysuria or hematuria. CIWA's  have been low        Medications:  Reviewed    Infusion Medications    sodium chloride 125 mL/hr at 02/28/20 6579     Scheduled Medications    amLODIPine  5 mg Oral Daily    aspirin  81 mg Oral Every Other Day    lisinopril  20 mg Oral Daily    sertraline  100 mg Oral Daily    sodium chloride flush  10 mL Intravenous 2 times per day    folic acid, thiamine, multi-vitamin with vitamin K infusion   Intravenous Daily    enoxaparin  40 mg Subcutaneous Daily    chlordiazePOXIDE  50 mg Oral TID    potassium chloride  40 mEq Oral Once    sodium chloride flush  10 mL Intravenous 2 times per day     PRN Meds: sodium chloride flush, acetaminophen, acetaminophen, promethazine, ondansetron, potassium chloride **OR** potassium alternative oral replacement **OR** potassium chloride, sodium chloride flush, LORazepam **OR** LORazepam **OR** LORazepam **OR** LORazepam **OR** LORazepam **OR** LORazepam **OR** LORazepam **OR** LORazepam      Intake/Output Summary (Last 24 hours) at 2/29/2020 0814  Last data filed at 2/29/2020 0450  Gross per 24 hour   Intake 480 ml   Output 0 ml   Net 480 ml       Physical Exam Performed:    /65   Pulse 86   Temp 98.4 °F (36.9 °C) (Oral)   Resp 16   Wt 173 lb 4.5 oz (78.6 kg)   SpO2 94%   BMI 27.97 kg/m²     General appearance: No apparent distress, appears stated age and cooperative. HEENT: Pupils equal, round, and reactive to light. Conjunctivae/corneas clear. Neck: Supple, with full range of motion.  No jugular venous distention. Trachea midline. Respiratory:  Normal respiratory effort. Clear to auscultation, bilaterally without Rales/Wheezes/Rhonchi. Cardiovascular: Regular rate and rhythm with normal S1/S2 without murmurs, rubs or gallops. Abdomen: Soft, non-tender, non-distended with normal bowel sounds. Musculoskeletal: No clubbing, cyanosis or edema bilaterally. Skin: Warm and dry  Neurologic:  Neurovascularly intact without any focal sensory/motor deficits. Cranial nerves: II-XII intact, grossly non-focal.  Mild hand tremors  Psychiatric: Alert, not anxious appearing  Capillary Refill: Brisk,< 3 seconds   Peripheral Pulses: +2 palpable, equal bilaterally       Labs:   Recent Labs     02/27/20 2251 02/28/20  0555   WBC 7.8 8.0   HGB 14.0 12.2   HCT 41.0 35.7*    142     Recent Labs     02/27/20 2251 02/28/20  0555    137   K 3.3* 3.1*   CL 92* 97*   CO2 23 27   BUN 4* 4*   CREATININE 0.6 <0.5*   CALCIUM 8.9 7.9*     Recent Labs     02/27/20 2251 02/28/20  0555   * 102*   ALT 48* 36   BILIDIR  --  0.6*   BILITOT 1.6* 1.3*   ALKPHOS 191* 150*     No results for input(s): INR in the last 72 hours. No results for input(s): Monda Saupe in the last 72 hours. Urinalysis:      Lab Results   Component Value Date    NITRU Negative 06/13/2018    WBCUA 3-5 04/07/2016    BACTERIA 3+ 04/07/2016    RBCUA 3-5 04/07/2016    BLOODU Negative 06/13/2018    SPECGRAV 1.010 06/13/2018    GLUCOSEU Negative 06/13/2018       Radiology:  CT Head WO Contrast   Final Result   No hemorrhage or mass identified      Periventricular and scattered frontal parietal white matter disease, likely   due to small-vessel ischemic change, with more focal remote infarct on the   right.   Ischemic changes have progressed since 2016                 Assessment/Plan:    ROSARIO/Yadi Aldana [address] Problems    Diagnosis    Acute encephalopathy [G93.40]    New onset seizure ([facility] [R56.9]    Alcohol withdrawal ([facility] [F10.239]    HTN

## 2020-02-29 NOTE — PROGRESS NOTES
(TYLENOL) tablet 650 mg  650 mg Oral Q6H PRN Donna Bettso, APRN - NP        acetaminophen (TYLENOL) suppository 650 mg  650 mg Rectal Q6H PRN Donna Kelly, APRN - NP        promethazine (PHENERGAN) tablet 12.5 mg  12.5 mg Oral Q6H PRN Donna Kelly, APRN - NP        ondansetron (ZOFRAN) injection 4 mg  4 mg Intravenous Q6H PRN Donna Kelly, APRN - NP        enoxaparin (LOVENOX) injection 40 mg  40 mg Subcutaneous Daily Rosslyn Kelly, APRN - NP   40 mg at 02/29/20 0945    potassium chloride (KLOR-CON M) extended release tablet 40 mEq  40 mEq Oral PRN Mariam Goff MD   40 mEq at 02/28/20 2049    Or    potassium bicarb-citric acid (EFFER-K) effervescent tablet 40 mEq  40 mEq Oral PRN Darleen Ferreira MD        Or    potassium chloride 10 mEq/100 mL IVPB (Peripheral Line)  10 mEq Intravenous PRN Darleen Ferreira MD        potassium chloride (KLOR-CON M) extended release tablet 40 mEq  40 mEq Oral Once Yohana Ferreira MD   Stopped at 02/28/20 1722    sodium chloride flush 0.9 % injection 10 mL  10 mL Intravenous 2 times per day FRANCIS Blevins CNP   10 mL at 02/28/20 0915    sodium chloride flush 0.9 % injection 10 mL  10 mL Intravenous PRN FRANCIS Blevins CNP        LORazepam (ATIVAN) tablet 1 mg  1 mg Oral Q1H PRN Donna Bettso, APRN - NP        Or    LORazepam (ATIVAN) injection 1 mg  1 mg Intravenous Q1H PRN Donna Kelly, APRN - NP   1 mg at 02/27/20 2343    Or    LORazepam (ATIVAN) tablet 2 mg  2 mg Oral Q1H PRN Donna Kelly, APRN - NP        Or    LORazepam (ATIVAN) injection 2 mg  2 mg Intravenous Q1H PRN Donna Kelly, APRN - NP   2 mg at 02/28/20 7697    Or    LORazepam (ATIVAN) tablet 3 mg  3 mg Oral Q1H PRN Donna Kelly, APRN - NP        Or    LORazepam (ATIVAN) injection 3 mg  3 mg Intravenous Q1H PRN Rosslyn Kelly, APRN - NP        Or    LORazepam (ATIVAN) tablet 4 mg  4 mg Oral Q1H PRN Rosslyn Kelly, APRN - NP        Or    LORazepam (ATIVAN) injection 4 mg  4 mg Intravenous Q1H PRN Kai Rich, APRN - NP         Allergies   Allergen Reactions    Codeine Nausea Only    Pcn [Penicillins]       reports that she has quit smoking. Her smoking use included cigarettes. She has a 30.00 pack-year smoking history. She has never used smokeless tobacco. She reports current alcohol use. She reports that she does not use drugs. Objective:  Exam:   Constitutional:   Vitals:    02/29/20 0424 02/29/20 0821 02/29/20 0930 02/29/20 1242   BP: 124/65  125/72 120/69   Pulse: 86  80 86   Resp: 16  16 16   Temp: 98.4 °F (36.9 °C)  97.7 °F (36.5 °C) 98.5 °F (36.9 °C)   TempSrc: Oral  Axillary Oral   SpO2: 94%  95% 96%   Weight:  160 lb (72.6 kg)     Height:  5' 6\" (1.676 m)       General appearance:  Normal development and appear in no acute distress. Eye: No icterus. Neck: supple  Cardiovascular:  No lower leg edema with good pulsation. Mental Status:   AAO x3 today  Poor immediate recall  Fluent speech  Good attention and concentration  Good fund of knowledge  No aphasia    Cranial Nerves:   II: Visual fields: Full. Pupils: equal, round, reactive to light  III,IV,VI: Extra Ocular Movements are intact. No nystagmus  V: Facial sensation is intact  VII: Facial strength and movements: intact and symmetric  IX: Palate elevation is symmetric  XI: Shoulder shrug is intact  XII: Tongue movements are normal  Musculoskeletal: 5/5 in all 4 extremities. Tone: Normal tone. Reflexes: Bilateral biceps 2/4, triceps 2/4, brachial radialis 2/4, knee 2/4 and ankle 2/4. Planters: flexor bilaterally. Coordination: no pronator drift, no dysmetria with FNF. Normal REM. Sensation: normal to all modalities in both arms and legs.   Gait/Posture: NT        Data:  LABS:   Lab Results   Component Value Date     02/29/2020    K 3.3 02/29/2020    K 3.1 02/28/2020     02/29/2020    CO2 23 02/29/2020    BUN 3 02/29/2020    CREATININE <0.5 02/29/2020    GFRAA >60 02/29/2020 LABGLOM >60 02/29/2020    GLUCOSE 84 02/29/2020    PHOS 2.7 06/13/2018    MG 1.90 02/28/2020    CALCIUM 7.6 02/29/2020     Lab Results   Component Value Date    WBC 4.4 02/29/2020    RBC 3.46 02/29/2020    HGB 11.4 02/29/2020    HCT 33.9 02/29/2020    MCV 98.0 02/29/2020    RDW 16.3 02/29/2020    PLT 98 02/29/2020     Lab Results   Component Value Date    INR 1.09 04/09/2016    PROTIME 12.5 04/09/2016       Neuroimaging was independently reviewed by me and discussed results with the patient  I reviewed blood testing and other test results and discussed results with the patient. Impression:  Acute encephalopathy and possible new onset seizure. Could be consistent with alcohol withdrawal seizure according to report. No risk for epilepsy  Alcohol abuse  Hypertension  Metabolic encephalopathy  Hyperlipidemia  Depression      Recommendation  Continue current supportive care  DT precautions  Seizure precaution and she is not allowed to drive until she is cleared from her physician. Long discussion with the patient regarding risk of seizure, falling and injury and risk of alcohol induced seizure. I do not feel the patient is considering recovery from alcohol at this point. Continue current blood pressure medications  Aspirin  Statin  PT and OT  Speech evaluation  Can be discharged from neurology when medically stable  If she is willing to proceed with further work-up regarding her seizure, she will need MRI and EEG. This can be scheduled in outpatient setting if she will be discharged from the hospital.  No further recommendation. Jose Mcdaniels MD   [phone]      This dictation was generated by voice recognition computer software. Although all attempts are made to edit the dictation for accuracy, there may be errors in the transcription that are not intended.

## 2020-03-01 VITALS
RESPIRATION RATE: 18 BRPM | BODY MASS INDEX: 28.9 KG/M2 | TEMPERATURE: 98.5 F | WEIGHT: 179.8 LBS | SYSTOLIC BLOOD PRESSURE: 148 MMHG | OXYGEN SATURATION: 96 % | HEART RATE: 96 BPM | HEIGHT: 66 IN | DIASTOLIC BLOOD PRESSURE: 57 MMHG

## 2020-03-01 PROBLEM — R56.9 SEIZURE DUE TO ALCOHOL WITHDRAWAL (HCC): Status: ACTIVE | Noted: 2020-03-01

## 2020-03-01 PROBLEM — F10.939 SEIZURE DUE TO ALCOHOL WITHDRAWAL (HCC): Status: ACTIVE | Noted: 2020-03-01

## 2020-03-01 LAB
MAGNESIUM: 1.4 MG/DL (ref 1.8–2.4)
ORGANISM: ABNORMAL
POTASSIUM SERPL-SCNC: 3.1 MMOL/L (ref 3.5–5.1)
POTASSIUM SERPL-SCNC: 3.6 MMOL/L (ref 3.5–5.1)
URINE CULTURE, ROUTINE: ABNORMAL

## 2020-03-01 PROCEDURE — 83735 ASSAY OF MAGNESIUM: CPT

## 2020-03-01 PROCEDURE — 6360000002 HC RX W HCPCS: Performed by: INTERNAL MEDICINE

## 2020-03-01 PROCEDURE — 6370000000 HC RX 637 (ALT 250 FOR IP): Performed by: INTERNAL MEDICINE

## 2020-03-01 PROCEDURE — 2580000003 HC RX 258: Performed by: NURSE PRACTITIONER

## 2020-03-01 PROCEDURE — 36415 COLL VENOUS BLD VENIPUNCTURE: CPT

## 2020-03-01 PROCEDURE — 84132 ASSAY OF SERUM POTASSIUM: CPT

## 2020-03-01 PROCEDURE — 6370000000 HC RX 637 (ALT 250 FOR IP): Performed by: NURSE PRACTITIONER

## 2020-03-01 PROCEDURE — 2500000003 HC RX 250 WO HCPCS: Performed by: NURSE PRACTITIONER

## 2020-03-01 PROCEDURE — 6360000002 HC RX W HCPCS: Performed by: NURSE PRACTITIONER

## 2020-03-01 RX ORDER — MAGNESIUM SULFATE IN WATER 40 MG/ML
4 INJECTION, SOLUTION INTRAVENOUS ONCE
Status: DISCONTINUED | OUTPATIENT
Start: 2020-03-01 | End: 2020-03-01

## 2020-03-01 RX ORDER — CIPROFLOXACIN 500 MG/1
500 TABLET, FILM COATED ORAL EVERY 12 HOURS SCHEDULED
Qty: 10 TABLET | Refills: 0 | Status: SHIPPED | OUTPATIENT
Start: 2020-03-01 | End: 2020-03-06

## 2020-03-01 RX ORDER — MAGNESIUM SULFATE IN WATER 40 MG/ML
2 INJECTION, SOLUTION INTRAVENOUS ONCE
Status: COMPLETED | OUTPATIENT
Start: 2020-03-01 | End: 2020-03-01

## 2020-03-01 RX ORDER — MAGNESIUM SULFATE IN WATER 40 MG/ML
2 INJECTION, SOLUTION INTRAVENOUS ONCE
Status: DISCONTINUED | OUTPATIENT
Start: 2020-03-01 | End: 2020-03-01

## 2020-03-01 RX ADMIN — POTASSIUM CHLORIDE 40 MEQ: 20 TABLET, EXTENDED RELEASE ORAL at 10:29

## 2020-03-01 RX ADMIN — ENOXAPARIN SODIUM 40 MG: 40 INJECTION SUBCUTANEOUS at 10:29

## 2020-03-01 RX ADMIN — LISINOPRIL 20 MG: 20 TABLET ORAL at 10:29

## 2020-03-01 RX ADMIN — MAGNESIUM SULFATE HEPTAHYDRATE 2 G: 40 INJECTION, SOLUTION INTRAVENOUS at 14:09

## 2020-03-01 RX ADMIN — FOLIC ACID: 5 INJECTION, SOLUTION INTRAMUSCULAR; INTRAVENOUS; SUBCUTANEOUS at 10:30

## 2020-03-01 RX ADMIN — ASPIRIN 81 MG 81 MG: 81 TABLET ORAL at 10:29

## 2020-03-01 RX ADMIN — SODIUM CHLORIDE: 9 INJECTION, SOLUTION INTRAVENOUS at 05:20

## 2020-03-01 RX ADMIN — AMLODIPINE BESYLATE 5 MG: 5 TABLET ORAL at 10:29

## 2020-03-01 RX ADMIN — CIPROFLOXACIN HYDROCHLORIDE 500 MG: 500 TABLET, FILM COATED ORAL at 10:30

## 2020-03-01 RX ADMIN — SERTRALINE HYDROCHLORIDE 100 MG: 50 TABLET ORAL at 10:29

## 2020-03-01 ASSESSMENT — PAIN SCALES - GENERAL: PAINLEVEL_OUTOF10: 0

## 2020-03-01 NOTE — PROGRESS NOTES
Patient discharged home with  transporting. Discharge instructions explained and given to patient. IV removed with no complications. Tele box removed and returned. Prescription called into home pharmacy. Patient belongings gathered and accounted for. Patient taken to car in wheelchair by RN.

## 2020-03-01 NOTE — PLAN OF CARE
Problem: Confusion - Acute:  Goal: Absence of continued neurological deterioration signs and symptoms  Description  Absence of continued neurological deterioration signs and symptoms  Outcome: Ongoing  Goal: Mental status will be restored to baseline  Description  Mental status will be restored to baseline  Outcome: Ongoing     Problem: Discharge Planning:  Goal: Ability to perform activities of daily living will improve  Description  Ability to perform activities of daily living will improve  Outcome: Ongoing  Goal: Participates in care planning  Description  Participates in care planning  Outcome: Ongoing     Problem: Injury - Risk of, Physical Injury:  Goal: Absence of physical injury  Description  Absence of physical injury  Outcome: Ongoing  Goal: Will remain free from falls  Description  Will remain free from falls  Outcome: Ongoing     Problem: Mood - Altered:  Goal: Mood stable  Description  Mood stable  Outcome: Ongoing  Goal: Absence of abusive behavior  Description  Absence of abusive behavior  Outcome: Ongoing  Goal: Verbalizations of feeling emotionally comfortable while being cared for will increase  Description  Verbalizations of feeling emotionally comfortable while being cared for will increase  Outcome: Ongoing     Problem: Psychomotor Activity - Altered:  Goal: Absence of psychomotor disturbance signs and symptoms  Description  Absence of psychomotor disturbance signs and symptoms  Outcome: Ongoing     Problem: Sensory Perception - Impaired:  Goal: Demonstrations of improved sensory functioning will increase  Description  Demonstrations of improved sensory functioning will increase  Outcome: Ongoing  Goal: Decrease in sensory misperception frequency  Description  Decrease in sensory misperception frequency  Outcome: Ongoing  Goal: Able to refrain from responding to false sensory perceptions  Description  Able to refrain from responding to false sensory perceptions  Outcome: Ongoing  Goal:

## 2020-03-01 NOTE — DISCHARGE SUMMARY
Hospital Medicine Discharge Summary    Patient ID: Swathi Roberto      Patient's PCP: Rock Starks MD    Admit Date: 2/27/2020     Discharge Date: 3/1/2020      Admitting Physician: Cj Fraga MD     Discharge Physician: Natanael Del Angel MD     Discharge Diagnoses: Active Hospital Problems    Diagnosis    Seiz[facility] due to [facility] withdrawal ([facility] [F10.239, R56.9]     Priority: High    Acute encephalopathy [G93.40]    Alcohol withdrawal ([facility] [F10.239]    HTN (hypertension), benign [I10]    HLD (hyperlipidemia) [E78.5]    DTs (delirium tremens) ([facility] [F10.231]    Depression [F32.9]    Alcohol abuse [F10.10]       The patient was seen and examined on day of discharge and this discharge summary is in conjunction with any daily progress note from day of discharge. HPI on 2/28/2020:   62 y.o. female, with PMH of HTN, HLD, alcohol abuse, and anxiety/depression, who presented to [facility] with seizure. History was obtained from the patient and review of the EMR. The patient states that while at work last night, she was not feeling well when she suddenly was witnessed having a 20-30 second seizure. The patient states that she has had an alcohol withdrawal seizure in the past, these are the only times when she has had a seizure is when she has detoxed from alcohol. She reports to me only drinking 2 glasses of wine on a daily basis. Last known drink was on 02/26/2020 around 2100. Per ED report, the patient stated that she drinks a bottle of wine on a daily basis. Denies any injury, pain, fever/chills, or any other symptoms. The patient denied any other associated symptoms as well as any aggravating and/or alleviating factors. At the time of this assessment, the patient was resting comfortably in bed. She currently denies any chest pain, back pain, abdominal pain, shortness of breath, numbness, tingling, N/V/C/D, fever and/or chills.          Hospital Course:     Seizure: has known hx of seizures with alcohol withdrawal in the past. Last drink reported on day prior to presentation. CT head non acute. Given a dose of IV dilantin in ER. Neuro assisted. No AED recommended unless seizure reoccurs. Outpatient follow-up with neuro for EEG and MRI as needed.        Alcohol abuse with withdrawal seizure: Improved with supportive care with CIWA protocol, benzos and was no longer in acute withdrawal.  Alcohol cessation advised.          Transaminitis : likely due to chronic alcohol use. LFTs improved.      Hyperlipidemia: statin d/meeta due to trasaminitis      HTN : continue home meds.         Hypokalemia and hypomagnesemia: Electrolytes were replaced.       E coli UTI : + urine culture. Continue PO Cipro x5 days      Physical Exam Performed:     BP (!) 148/57   Pulse 96   Temp 98.5 °F (36.9 °C) (Oral)   Resp 18   Ht 5' 6\" (1.676 m)   Wt 179 lb 12.8 oz (81.6 kg)   SpO2 96%   BMI 29.02 kg/m²       General appearance:  No apparent distress, appears stated age and cooperative. HEENT:  Normal cephalic, atraumatic without obvious deformity. Conjunctivae/corneas clear. Neck: Supple, with full range of motion. No jugular venous distention. Trachea midline. Respiratory:  Normal respiratory effort. Clear to auscultation, bilaterally without Rales/Wheezes/Rhonchi. Cardiovascular:  Regular rate and rhythm with normal S1/S2 without murmurs, rubs or gallops. Abdomen: Soft, non-tender, non-distended with normal bowel sounds. Musculoskeletal:  No clubbing, cyanosis or edema bilaterally. Full range of motion without deformity. Skin: Skin color, texture, turgor normal.  No rashes or lesions. Neurologic:  Neurovascularly intact without any focal sensory/motor deficits.  Cranial nerves: II-XII intact, grossly non-focal.  Psychiatric:  Alert and oriented, thought content appropriate, normal insight  Capillary Refill: Brisk,< 3 seconds   Peripheral Pulses: +2 palpable, equal bilaterally       Labs: For convenience and continuity at follow-up the following most recent labs are provided:      CBC:    Lab Results   Component Value Date    WBC 4.4 02/29/2020    HGB 11.4 02/29/2020    HCT 33.9 02/29/2020    PLT 98 02/29/2020       Renal:    Lab Results   Component Value Date     02/29/2020    K 3.6 03/01/2020    K 3.1 02/28/2020     02/29/2020    CO2 23 02/29/2020    BUN 3 02/29/2020    CREATININE <0.5 02/29/2020    CALCIUM 7.6 02/29/2020    PHOS 2.7 06/13/2018         Significant Diagnostic Studies    Radiology:   CT Head WO Contrast   Final Result   No hemorrhage or mass identified      Periventricular and scattered frontal parietal white matter disease, likely   due to small-vessel ischemic change, with more focal remote infarct on the   right.   Ischemic changes have progressed since 2016                Consults:     IP CONSULT TO HOSPITALIST  IP CONSULT TO NEUROLOGY    Disposition: Home    Condition at Discharge: Stable    Discharge Instructions/Follow-up:    - Follow up with PCP in one week   - Follow up with neurology, Dr Jamison Silva  for outpatient EEG and MRI   - No driving for at least 3 months until cleared by your physician         Code Status:  Full Code    Activity: activity as tolerated    Diet: regular diet      Discharge Medications:     Discharge Medication List as of 3/1/2020  4:15 PM           Details   ciprofloxacin (CIPRO) 500 MG tablet Take 1 tablet by mouth every 12 hours for 5 days, Disp-10 tablet, R-0Print              Details   Multiple Vitamins-Minerals (THERAPEUTIC MULTIVITAMIN-MINERALS) tablet Take 1 tablet by mouth daily      Omega-3 Fatty Acids (FISH OIL) 1200 MG CAPS Take 1 capsule by mouth daily      promethazine (PHENERGAN) 25 MG tablet Take 25 mg by mouth every 6 hours as needed for Nausea      sertraline (ZOLOFT) 100 MG tablet Take 100 mg by mouth daily      aspirin 81 MG tablet Take 81 mg by mouth every other day       lisinopril (PRINIVIL;ZESTRIL) 20 MG tablet Take 20 mg by mouth daily. amLODIPine (NORVASC) 5 MG tablet Take 5 mg by mouth daily. Time Spent on discharge is more than 30 minutes in the examination, evaluation, counseling and review of medications and discharge plan. Signed:    Mala Singh MD   3/1/2020      Thank you Ana Trotter MD for the opportunity to be involved in this patient's care. If you have any questions or concerns please feel free to contact me at [phone].